# Patient Record
Sex: FEMALE
[De-identification: names, ages, dates, MRNs, and addresses within clinical notes are randomized per-mention and may not be internally consistent; named-entity substitution may affect disease eponyms.]

---

## 2019-01-09 ENCOUNTER — HOSPITAL ENCOUNTER (EMERGENCY)
Dept: HOSPITAL 39 - ER | Age: 44
Discharge: HOME | End: 2019-01-09
Payer: SELF-PAY

## 2019-01-09 VITALS — DIASTOLIC BLOOD PRESSURE: 75 MMHG | SYSTOLIC BLOOD PRESSURE: 110 MMHG

## 2019-01-09 VITALS — TEMPERATURE: 98.2 F | OXYGEN SATURATION: 99 %

## 2019-01-09 DIAGNOSIS — S39.012A: Primary | ICD-10-CM
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
## 2019-01-09 PROCEDURE — 72100 X-RAY EXAM L-S SPINE 2/3 VWS: CPT

## 2019-01-09 PROCEDURE — 81001 URINALYSIS AUTO W/SCOPE: CPT

## 2019-01-09 NOTE — RAD
EXAM DESCRIPTION:



Lumbar Spine 3 Views



CLINICAL HISTORY:



43 years Female, LBP



COMPARISON:



None.



FINDINGS:



3 views of the lumbar spine show vertebral body heights to be

maintained. Straightening of the normal lumbar lordosis is seen.

Mild disc space narrowing at L5-S1 is seen. No spondylolysis or

spondylolisthesis.







IMPRESSION:



Mild-to-moderate disc degenerative changes at L5-S1 are seen.



 



Electronically signed by:  Jarret Brown MD  1/9/2019 12:49 PM [facility]

Workstation: [id number]

## 2019-01-09 NOTE — ED.PDOC
History of Present Illness





- General


Time Seen by Provider: 01/09/19 10:10


Source: patient


Exam Limitations: no limitations





- History of Present Illness


Initial Comments: 





LBP, NO TRAUMA, SAT DOWN ON TOILET HAD ACUTE ONSET OF PAIN WHEN SHE TRIED TO 

STAND UP. HAS BEEN IN PAIN SINCE. POOR HISTORIAN. 


Timing/Duration: other - 5 DAYS


Quality/Severity: moderate


Back Pain Location: lumbar spine


Back Pain Radiation: other - DIFFICULT TO ASSESS RADIATION. 


Allergies/Adverse Reactions: 


Allergies





NO KNOWN ALLERGY Allergy (Verified 01/09/19 10:22)


   








Home Medications: 


Ambulatory Orders





Cyclobenzaprine HCl [Flexeril] 10 mg PO TID PRN #15 tab 01/09/19 


Indomethacin 50 mg PO TID PRN #14 cap 01/09/19 











Review of Systems





- Review of Systems


Constitutional: Denies: chills, fever


EENTM: States: no symptoms reported


Respiratory: Denies: short of breath


Cardiology: Denies: chest pain


Gastrointestinal/Abdominal: Denies: nausea, vomiting


Genitourinary: States: other - NO INCONTINENCE.  Denies: dysuria, frequency, 

hematuria


Musculoskeletal: States: back pain.  Denies: joint pain, joint swelling, neck 

pain


Skin: States: no symptoms reported


Neurological: Denies: numbness, weakness


Endocrine: States: no symptoms reported


Hematologic/Lymphatic: States: no symptoms reported





Family Medical History





- Family History


  ** Mother


Family History: Unknown


Living Status: Unknown





Progress





- Progress


Progress: 





01/09/19 12:04


STATES PAIN IS BETTER BUT STILL WILL NOT TRY TO SIT UP BY HERSELF. 


01/09/19 14:03


IS MUCH BETTER, NO LONGER CRYING, SITTING IN WHEEL CHAIR WITHOUT DIFFICULTY





- EKG/XRAY/CT


XRAY: LS SPINE, CHASE





Departure





- Departure


Clinical Impression: 


Lumbosacral strain


Qualifiers:


 Encounter type: initial encounter Qualified Code(s): S39.012A - Strain of 

muscle, fascia and tendon of lower back, initial encounter





Time of Disposition: 14:04


Disposition: Discharge to Home or Self Care


Condition: Good


Instructions:  DI for Low Back Pain, DI for Back Strain or Sprain


Diet: resume usual diet, regular diet


Referrals: 


Isidro Johnson MD [Primary Care Provider] - 1-2 Weeks


Prescriptions: 


Cyclobenzaprine HCl [Flexeril] 10 mg PO TID PRN #15 tab


 PRN Reason: Pain


Indomethacin 50 mg PO TID PRN #14 cap


 PRN Reason: Pain


Home Medications: 


Ambulatory Orders





Cyclobenzaprine HCl [Flexeril] 10 mg PO TID PRN #15 tab 01/09/19 


Indomethacin 50 mg PO TID PRN #14 cap 01/09/19